# Patient Record
Sex: MALE | Race: OTHER | NOT HISPANIC OR LATINO | URBAN - METROPOLITAN AREA
[De-identification: names, ages, dates, MRNs, and addresses within clinical notes are randomized per-mention and may not be internally consistent; named-entity substitution may affect disease eponyms.]

---
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
## 2024-09-03 NOTE — ED PROVIDER NOTE - PATIENT PORTAL LINK FT
[facility]  Rehabilitation Psychology    Date: 9/3/2024    Due to COVID-19 precautions, this visit was performed via live interactive two-way Video visit with patient's verbal consent.   Clinician Location: [facility]  Patient Location: [facility].  Verified patient identity:  [x] Yes    INSURANCE INFORMATION     Ins[facility]  [facility]  Claim:    Claim Status:  Claim open and in good standing  Referred by:  Chito Peters, DO      TREATMENT     Data: Met with Abel Gomez for initial visit.  Records reviewed and patient seen.  Evaluated current level of stress, ways of coping, current concerns.  Explained role of Rehabilitation Psychologist in [facility] program.    The patient reports that he has been employed as a  for the past 2 years.  He was injured on the job on 1/16/2024 when he was tightening a strap that came loose and he fell onto his elbow and shoulder.  Prior to pursuing surgery, he tried cortisone injections as well as physical therapy.  Ultimately, he had a rotator cuff repair surgery on 6/11/2024.  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  His next appointment with Dr. Peters is on 9/23/2024.  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    The patient reports financial stress related to not receiving his full income.  He is the only income earlier in his household, as his wife stays home with the children.  He indicates that the work injury has \"impacted my attitude.\"  Specifically, he reports increased anxiety, increased depressive symptoms, irritability and hopelessness.  He feels that his injury has amplified these psychological symptoms.  He denies a history of behavioral health treatments including medication or psychotherapy.  He attended 1 consultation/conversation with a behavioral health provider to discuss possible ADD symptoms.  The patient is open to learning cognitive behavioral  coping strategies.  He will consider whether he would like to speak with his primary care provider regarding medication management of the anxiety and depression.  The patient denies both suicidal and homicidal ideation.  The patient denies use of nicotine and drugs.  He states that he quit vaping 1 month ago.  He only consumes alcohol occasionally.    The patient lives with his wife, 14-year-old stepson, 5-year-old son and 2-year-old daughter.  His wife started online college courses today.  The patient did not go to college, stating that he has always done a driving-related job.  He explains that his ADD symptoms likely got in the way of a higher education.    Assessment: Performed a video visit intake assessment    Response: The patient understood and was receptive to the interventions used.    Plan: Meet weekly for a few weeks to discuss cognitive behavioral strategies.  Next appointment 9/10/2024 at 9:30 AM      PLAN     mood disturbance  anxiety problems  financial concerns    Goals:  Demonstrate improved coping with stress by utilizing relaxation strategies, such as diaphragmatic breathing and guided imagery techniques.  Demonstrate the use of cognitive behavioral mood management strategies.  Report improved adaption to pain/limitations in order to return to favored activities.  Verbalize preparation for return to work and ways to prevent further injury.    BILLING     Billing Code: [id number] (Intake - 1 unit)  Time Spent with Patient: 30 minutes     Diagnosis: Z56.9 Unspecified problems related to employment   You can access the FollowMyHealth Patient Portal offered by [facility] by registering at the following website: [email or web address]. By joining [facility]’s FollowMyHealth portal, you will also be able to view your health information using other applications (apps) compatible with our system.